# Patient Record
Sex: FEMALE | Race: BLACK OR AFRICAN AMERICAN | NOT HISPANIC OR LATINO
[De-identification: names, ages, dates, MRNs, and addresses within clinical notes are randomized per-mention and may not be internally consistent; named-entity substitution may affect disease eponyms.]

---

## 2019-05-06 ENCOUNTER — APPOINTMENT (OUTPATIENT)
Dept: GASTROENTEROLOGY | Facility: CLINIC | Age: 64
End: 2019-05-06

## 2019-06-14 ENCOUNTER — APPOINTMENT (OUTPATIENT)
Dept: GASTROENTEROLOGY | Facility: CLINIC | Age: 64
End: 2019-06-14
Payer: MEDICARE

## 2019-06-14 PROCEDURE — 45378 DIAGNOSTIC COLONOSCOPY: CPT | Mod: PT

## 2019-10-18 VITALS
TEMPERATURE: 98 F | DIASTOLIC BLOOD PRESSURE: 68 MMHG | OXYGEN SATURATION: 97 % | HEART RATE: 65 BPM | WEIGHT: 222.01 LBS | SYSTOLIC BLOOD PRESSURE: 149 MMHG | HEIGHT: 62 IN

## 2019-10-18 RX ORDER — INFLUENZA VIRUS VACCINE 15; 15; 15; 15 UG/.5ML; UG/.5ML; UG/.5ML; UG/.5ML
0.5 SUSPENSION INTRAMUSCULAR ONCE
Refills: 0 | Status: DISCONTINUED | OUTPATIENT
Start: 2019-10-21 | End: 2019-10-23

## 2019-10-21 ENCOUNTER — INPATIENT (INPATIENT)
Facility: HOSPITAL | Age: 64
LOS: 1 days | Discharge: HOME | DRG: 740 | End: 2019-10-23
Attending: OBSTETRICS & GYNECOLOGY | Admitting: OBSTETRICS & GYNECOLOGY
Payer: MEDICARE

## 2019-10-21 ENCOUNTER — RESULT REVIEW (OUTPATIENT)
Age: 64
End: 2019-10-21

## 2019-10-21 DIAGNOSIS — Z98.890 OTHER SPECIFIED POSTPROCEDURAL STATES: Chronic | ICD-10-CM

## 2019-10-21 LAB
BLD GP AB SCN SERPL QL: NEGATIVE — SIGNIFICANT CHANGE UP
GLUCOSE BLDC GLUCOMTR-MCNC: 90 MG/DL — SIGNIFICANT CHANGE UP (ref 70–99)
RH IG SCN BLD-IMP: POSITIVE — SIGNIFICANT CHANGE UP

## 2019-10-21 RX ORDER — SODIUM CHLORIDE 9 MG/ML
1000 INJECTION, SOLUTION INTRAVENOUS
Refills: 0 | Status: DISCONTINUED | OUTPATIENT
Start: 2019-10-21 | End: 2019-10-22

## 2019-10-21 RX ORDER — METOCLOPRAMIDE HCL 10 MG
10 TABLET ORAL EVERY 6 HOURS
Refills: 0 | Status: DISCONTINUED | OUTPATIENT
Start: 2019-10-21 | End: 2019-10-23

## 2019-10-21 RX ORDER — OXYCODONE HYDROCHLORIDE 5 MG/1
5 TABLET ORAL EVERY 4 HOURS
Refills: 0 | Status: DISCONTINUED | OUTPATIENT
Start: 2019-10-21 | End: 2019-10-23

## 2019-10-21 RX ORDER — HYDROMORPHONE HYDROCHLORIDE 2 MG/ML
0.5 INJECTION INTRAMUSCULAR; INTRAVENOUS; SUBCUTANEOUS
Refills: 0 | Status: DISCONTINUED | OUTPATIENT
Start: 2019-10-21 | End: 2019-10-23

## 2019-10-21 RX ORDER — BUPIVACAINE 13.3 MG/ML
20 INJECTION, SUSPENSION, LIPOSOMAL INFILTRATION ONCE
Refills: 0 | Status: DISCONTINUED | OUTPATIENT
Start: 2019-10-21 | End: 2019-10-23

## 2019-10-21 RX ORDER — ONDANSETRON 8 MG/1
4 TABLET, FILM COATED ORAL ONCE
Refills: 0 | Status: DISCONTINUED | OUTPATIENT
Start: 2019-10-21 | End: 2019-10-23

## 2019-10-21 RX ORDER — LATANOPROST 0.05 MG/ML
1 SOLUTION/ DROPS OPHTHALMIC; TOPICAL AT BEDTIME
Refills: 0 | Status: DISCONTINUED | OUTPATIENT
Start: 2019-10-21 | End: 2019-10-23

## 2019-10-21 RX ORDER — KETOROLAC TROMETHAMINE 30 MG/ML
30 SYRINGE (ML) INJECTION EVERY 6 HOURS
Refills: 0 | Status: DISCONTINUED | OUTPATIENT
Start: 2019-10-21 | End: 2019-10-23

## 2019-10-21 RX ORDER — OXYCODONE HYDROCHLORIDE 5 MG/1
10 TABLET ORAL EVERY 4 HOURS
Refills: 0 | Status: DISCONTINUED | OUTPATIENT
Start: 2019-10-21 | End: 2019-10-23

## 2019-10-21 RX ORDER — DOCUSATE SODIUM 100 MG
100 CAPSULE ORAL THREE TIMES A DAY
Refills: 0 | Status: DISCONTINUED | OUTPATIENT
Start: 2019-10-21 | End: 2019-10-23

## 2019-10-21 RX ORDER — ACETAMINOPHEN 500 MG
1000 TABLET ORAL ONCE
Refills: 0 | Status: COMPLETED | OUTPATIENT
Start: 2019-10-21 | End: 2019-10-22

## 2019-10-21 RX ADMIN — Medication 30 MILLIGRAM(S): at 23:15

## 2019-10-21 RX ADMIN — OXYCODONE HYDROCHLORIDE 5 MILLIGRAM(S): 5 TABLET ORAL at 21:27

## 2019-10-21 RX ADMIN — HYDROMORPHONE HYDROCHLORIDE 0.5 MILLIGRAM(S): 2 INJECTION INTRAMUSCULAR; INTRAVENOUS; SUBCUTANEOUS at 20:16

## 2019-10-21 RX ADMIN — HYDROMORPHONE HYDROCHLORIDE 0.5 MILLIGRAM(S): 2 INJECTION INTRAMUSCULAR; INTRAVENOUS; SUBCUTANEOUS at 19:52

## 2019-10-21 RX ADMIN — SODIUM CHLORIDE 125 MILLILITER(S): 9 INJECTION, SOLUTION INTRAVENOUS at 19:59

## 2019-10-21 RX ADMIN — OXYCODONE HYDROCHLORIDE 5 MILLIGRAM(S): 5 TABLET ORAL at 22:27

## 2019-10-21 RX ADMIN — Medication 30 MILLIGRAM(S): at 23:00

## 2019-10-21 RX ADMIN — LATANOPROST 1 DROP(S): 0.05 SOLUTION/ DROPS OPHTHALMIC; TOPICAL at 23:00

## 2019-10-21 RX ADMIN — HYDROMORPHONE HYDROCHLORIDE 0.5 MILLIGRAM(S): 2 INJECTION INTRAMUSCULAR; INTRAVENOUS; SUBCUTANEOUS at 20:11

## 2019-10-21 NOTE — PROGRESS NOTE ADULT - ASSESSMENT
A: 65yo with PMH significant for HTN and obesity s/p MERNA and BSO for endometrial hyperplasia with atypia found on workup for post-menopausal bleeding, POD0  Plan:  1. Vital signs stable, continue to monitor per protocol  2. Pain control: toradol/tylenol atc, oxy 5/10, dilaudid for breakthrough  3. DVT prophylaxis: SCDs, lovenox in am  4. CV: IVH @125cc/hr; HTN though holding home meds for now as patient was hypotensive in OR, VSS  5. Pulm: Incentive spirometer (at least 10 times per hour while awake)   6. GI: NPO except meds tonight, sips in AM; zofran/reglan for nausea prn; colace, simethicone, colace  7. : [place]   8. Follow up labs: AM CBC  9. Activity: bedrest tonight

## 2019-10-21 NOTE — H&P ADULT - NSICDXPASTMEDICALHX_GEN_ALL_CORE_FT
PAST MEDICAL HISTORY:  Endometriosis     GERD (gastroesophageal reflux disease)     HLD (hyperlipidemia)     HTN (hypertension)     Osteoarthritis

## 2019-10-21 NOTE — H&P ADULT - HISTORY OF PRESENT ILLNESS
63 yo menopausal female presenting for scheduled MERNA/BSO due to endometrial hyperplasia found on workup for postmenopausal bleeding.

## 2019-10-21 NOTE — PROGRESS NOTE ADULT - SUBJECTIVE AND OBJECTIVE BOX
GYN POC    Pt seen and examined at bedside. Abdominal pain is well controlled with pain meds and she is due for oxycodone now. Pt denies any fever, chills, chest pain, SOB, nausea or vomiting. She is using the incentive spirometer at bedside.    T(F): 98 (10-21-19 @ 21:04), Max: 98.7 (10-21-19 @ 20:28)  HR: 70 (10-21-19 @ 21:04) (62 - 73)  BP: 134/74 (10-21-19 @ 21:04) (131/63 - 151/69)  RR: 16 (10-21-19 @ 21:04) (13 - 20)  SpO2: 94% (10-21-19 @ 21:04) (93% - 96%)  Wt(kg): --    10-21 @ 07:01  -  10-21 @ 21:34  --------------------------------------------------------  IN: 250 mL / OUT: 200 mL / NET: 50 mL        acetaminophen  IVPB .. 1000 milliGRAM(s) IV Intermittent once  BUpivacaine liposome 1.3% Injectable (no eMAR) 20 milliLiter(s) Local Injection once  docusate sodium 100 milliGRAM(s) Oral three times a day PRN Stool Softening  HYDROmorphone  Injectable 0.5 milliGRAM(s) IV Push every 15 minutes PRN Severe Pain (7 - 10)  influenza   Vaccine 0.5 milliLiter(s) IntraMuscular once  ketorolac   Injectable 30 milliGRAM(s) IV Push every 6 hours  lactated ringers. 1000 milliLiter(s) IV Continuous <Continuous>  latanoprost 0.005% Ophthalmic Solution 1 Drop(s) Both EYES at bedtime  metoclopramide Injectable 10 milliGRAM(s) IV Push every 6 hours PRN Nausea and/or Vomiting  ondansetron Injectable 4 milliGRAM(s) IV Push once PRN Postoperative Nausea and/or Vomiting  oxyCODONE    IR 5 milliGRAM(s) Oral every 4 hours PRN Moderate Pain (4 - 6)  oxyCODONE    IR 10 milliGRAM(s) Oral every 4 hours PRN Severe Pain (7 - 10)      Physical exam:  Constitutional: NAD  Pulmonary: regular respiratory effort  Abdomen: pressure dressing in place, obese abdomen soft and non-tender, non-distended  Extremities: no lower extremity edema, or calve tenderness. SCDs in place

## 2019-10-21 NOTE — H&P ADULT - ASSESSMENT
63 yo female to be admitted to regional floor for routine postoperative care following scheduled surgery

## 2019-10-21 NOTE — BRIEF OPERATIVE NOTE - PRIMARY SURGEON
Per Phoebe Funez, DPFIONA Ania does not need to be pre medicated with an antibiotic.    was notified of this via voicemail message.    Dr Leatha Castano

## 2019-10-21 NOTE — BRIEF OPERATIVE NOTE - OPERATION/FINDINGS
8cm mobile uterus on bimanual exam.  No evidence of intraabdominal pathology.  Uterus with small anterior fibroid , minimal adnexal adhesions.  Ovaries and fallopian tubes grossly normal.  Small calcified nodule noted in right posterior cul de sac.

## 2019-10-21 NOTE — H&P ADULT - NSICDXPASTSURGICALHX_GEN_ALL_CORE_FT
PAST SURGICAL HISTORY:  History of breast biopsy left    History of colonoscopy     History of hand surgery Right hand    History of shoulder surgery Right Rotator cuff repair

## 2019-10-22 LAB
ANION GAP SERPL CALC-SCNC: 11 MMOL/L — SIGNIFICANT CHANGE UP (ref 5–17)
BASOPHILS # BLD AUTO: 0.01 K/UL — SIGNIFICANT CHANGE UP (ref 0–0.2)
BASOPHILS NFR BLD AUTO: 0.1 % — SIGNIFICANT CHANGE UP (ref 0–2)
BUN SERPL-MCNC: 17 MG/DL — SIGNIFICANT CHANGE UP (ref 7–23)
CALCIUM SERPL-MCNC: 8.8 MG/DL — SIGNIFICANT CHANGE UP (ref 8.4–10.5)
CHLORIDE SERPL-SCNC: 103 MMOL/L — SIGNIFICANT CHANGE UP (ref 96–108)
CO2 SERPL-SCNC: 26 MMOL/L — SIGNIFICANT CHANGE UP (ref 22–31)
CREAT SERPL-MCNC: 1.04 MG/DL — SIGNIFICANT CHANGE UP (ref 0.5–1.3)
EOSINOPHIL # BLD AUTO: 0 K/UL — SIGNIFICANT CHANGE UP (ref 0–0.5)
EOSINOPHIL NFR BLD AUTO: 0 % — SIGNIFICANT CHANGE UP (ref 0–6)
GLUCOSE SERPL-MCNC: 137 MG/DL — HIGH (ref 70–99)
HCT VFR BLD CALC: 37.7 % — SIGNIFICANT CHANGE UP (ref 34.5–45)
HCV AB S/CO SERPL IA: 0.09 S/CO — SIGNIFICANT CHANGE UP
HCV AB SERPL-IMP: SIGNIFICANT CHANGE UP
HGB BLD-MCNC: 12.2 G/DL — SIGNIFICANT CHANGE UP (ref 11.5–15.5)
IMM GRANULOCYTES NFR BLD AUTO: 0.4 % — SIGNIFICANT CHANGE UP (ref 0–1.5)
LYMPHOCYTES # BLD AUTO: 1.16 K/UL — SIGNIFICANT CHANGE UP (ref 1–3.3)
LYMPHOCYTES # BLD AUTO: 10.3 % — LOW (ref 13–44)
MCHC RBC-ENTMCNC: 26.9 PG — LOW (ref 27–34)
MCHC RBC-ENTMCNC: 32.4 GM/DL — SIGNIFICANT CHANGE UP (ref 32–36)
MCV RBC AUTO: 83 FL — SIGNIFICANT CHANGE UP (ref 80–100)
MONOCYTES # BLD AUTO: 0.37 K/UL — SIGNIFICANT CHANGE UP (ref 0–0.9)
MONOCYTES NFR BLD AUTO: 3.3 % — SIGNIFICANT CHANGE UP (ref 2–14)
NEUTROPHILS # BLD AUTO: 9.71 K/UL — HIGH (ref 1.8–7.4)
NEUTROPHILS NFR BLD AUTO: 85.9 % — HIGH (ref 43–77)
NRBC # BLD: 0 /100 WBCS — SIGNIFICANT CHANGE UP (ref 0–0)
PLATELET # BLD AUTO: 224 K/UL — SIGNIFICANT CHANGE UP (ref 150–400)
POTASSIUM SERPL-MCNC: 3.6 MMOL/L — SIGNIFICANT CHANGE UP (ref 3.5–5.3)
POTASSIUM SERPL-SCNC: 3.6 MMOL/L — SIGNIFICANT CHANGE UP (ref 3.5–5.3)
RBC # BLD: 4.54 M/UL — SIGNIFICANT CHANGE UP (ref 3.8–5.2)
RBC # FLD: 17 % — HIGH (ref 10.3–14.5)
SODIUM SERPL-SCNC: 140 MMOL/L — SIGNIFICANT CHANGE UP (ref 135–145)
WBC # BLD: 11.29 K/UL — HIGH (ref 3.8–10.5)
WBC # FLD AUTO: 11.29 K/UL — HIGH (ref 3.8–10.5)

## 2019-10-22 RX ORDER — POTASSIUM CHLORIDE 20 MEQ
20 PACKET (EA) ORAL
Refills: 0 | Status: COMPLETED | OUTPATIENT
Start: 2019-10-22 | End: 2019-10-23

## 2019-10-22 RX ORDER — ENOXAPARIN SODIUM 100 MG/ML
40 INJECTION SUBCUTANEOUS DAILY
Refills: 0 | Status: DISCONTINUED | OUTPATIENT
Start: 2019-10-22 | End: 2019-10-23

## 2019-10-22 RX ADMIN — Medication 30 MILLIGRAM(S): at 11:08

## 2019-10-22 RX ADMIN — Medication 30 MILLIGRAM(S): at 06:01

## 2019-10-22 RX ADMIN — OXYCODONE HYDROCHLORIDE 5 MILLIGRAM(S): 5 TABLET ORAL at 02:33

## 2019-10-22 RX ADMIN — OXYCODONE HYDROCHLORIDE 5 MILLIGRAM(S): 5 TABLET ORAL at 17:15

## 2019-10-22 RX ADMIN — Medication 30 MILLIGRAM(S): at 17:29

## 2019-10-22 RX ADMIN — OXYCODONE HYDROCHLORIDE 5 MILLIGRAM(S): 5 TABLET ORAL at 12:07

## 2019-10-22 RX ADMIN — Medication 20 MILLIEQUIVALENT(S): at 17:10

## 2019-10-22 RX ADMIN — OXYCODONE HYDROCHLORIDE 5 MILLIGRAM(S): 5 TABLET ORAL at 11:07

## 2019-10-22 RX ADMIN — Medication 30 MILLIGRAM(S): at 11:23

## 2019-10-22 RX ADMIN — OXYCODONE HYDROCHLORIDE 5 MILLIGRAM(S): 5 TABLET ORAL at 01:33

## 2019-10-22 RX ADMIN — OXYCODONE HYDROCHLORIDE 5 MILLIGRAM(S): 5 TABLET ORAL at 06:44

## 2019-10-22 RX ADMIN — Medication 400 MILLIGRAM(S): at 00:46

## 2019-10-22 RX ADMIN — Medication 30 MILLIGRAM(S): at 17:10

## 2019-10-22 RX ADMIN — LATANOPROST 1 DROP(S): 0.05 SOLUTION/ DROPS OPHTHALMIC; TOPICAL at 22:25

## 2019-10-22 RX ADMIN — ENOXAPARIN SODIUM 40 MILLIGRAM(S): 100 INJECTION SUBCUTANEOUS at 11:07

## 2019-10-22 RX ADMIN — Medication 1000 MILLIGRAM(S): at 00:01

## 2019-10-22 RX ADMIN — OXYCODONE HYDROCHLORIDE 5 MILLIGRAM(S): 5 TABLET ORAL at 18:15

## 2019-10-22 RX ADMIN — Medication 30 MILLIGRAM(S): at 05:36

## 2019-10-22 RX ADMIN — OXYCODONE HYDROCHLORIDE 5 MILLIGRAM(S): 5 TABLET ORAL at 07:44

## 2019-10-22 NOTE — PROGRESS NOTE ADULT - ASSESSMENT
A: 63yo with PMH significant for HTN and obesity s/p MERNA and BSO for endometrial hyperplasia with atypia found on workup for post-menopausal bleeding, POD1  Plan:  1. Vital signs stable, continue to monitor per protocol  2. Pain control: toradol/tylenol atc, oxy 5/10, dilaudid for breakthrough  3. DVT prophylaxis: SCDs, lovenox  4. CV: IVH @125cc/hr; HTN though holding home meds for now as patient was hypotensive in OR, VSS  5. Pulm: Incentive spirometer (at least 10 times per hour while awake)   6. GI: sips this AM,  zofran/reglan for nausea prn; colace, simethicone, colace  7. : [place], f/u TOV  8. Follow up labs: AM CBC  9. Activity: ambulate as tolerated

## 2019-10-22 NOTE — PROGRESS NOTE ADULT - ASSESSMENT
64yo F POD1 s/p MERNA, BSO. Pt is hemodynamically stable.     1. Neuro/Pain: Toradol/Tylenol ATC, Oxycodone PRN  2  CV: VS per routine  3. Pulm: Encourage ISS at least 10 times per hour while awake   4. GI: NPO+sips, Clears for dinner  Simethicone/zofran/reglan/colace  5. : Voiding   6. Heme: stable  7. DVT ppx: SCDs  8. Activity: ambulate as tolerated

## 2019-10-22 NOTE — PROGRESS NOTE ADULT - SUBJECTIVE AND OBJECTIVE BOX
GYN Progress Note    Patient seen at bedside.  YVON overnight.  Reports feeling well this AM.  Pain controlled on Toradol/tylenol with Oxycodone PRN.    She is NPO - no nausea or vomiting.  Holder is in situ and she is not yet voiding or ambulating.      Denies CP, palpitations, SOB, fever, chills.    Vital Signs Last 24 Hrs  T(C): 36.5 (22 Oct 2019 05:43), Max: 37.1 (21 Oct 2019 20:28)  T(F): 97.7 (22 Oct 2019 05:43), Max: 98.7 (21 Oct 2019 20:28)  HR: 66 (22 Oct 2019 05:43) (62 - 73)  BP: 128/71 (22 Oct 2019 05:43) (122/68 - 151/69)  BP(mean): 89 (21 Oct 2019 20:20) (87 - 99)  RR: 16 (22 Oct 2019 05:43) (13 - 20)  SpO2: 97% (22 Oct 2019 05:43) (93% - 97%)    Physical Exam:  Gen: No Acute Distress  Pulm: Clear to auscultation bilaterally  GI: soft, nontender, mildly distended, hypoactive BS, no rebound, no guarding.  Pressure dressing c/d/i over [place]ste[place]  Ext: SCDs in place, [facility]    I&O's Summary    21 Oct 2019 07:01  -  22 Oct 2019 06:40  --------------------------------------------------------  IN: 250 mL / OUT: 700 mL / NET: -450 mL      MEDICATIONS  (STANDING):  BUpivacaine liposome 1.3% Injectable (no eMAR) 20 milliLiter(s) Local Injection once  enoxaparin Injectable 40 milliGRAM(s) SubCutaneous daily  influenza   Vaccine 0.5 milliLiter(s) IntraMuscular once  ketorolac   Injectable 30 milliGRAM(s) IV Push every 6 hours  lactated ringers. 1000 milliLiter(s) (125 mL/Hr) IV Continuous <Continuous>  latanoprost 0.005% Ophthalmic Solution 1 Drop(s) Both EYES at bedtime    MEDICATIONS  (PRN):  docusate sodium 100 milliGRAM(s) Oral three times a day PRN Stool Softening  HYDROmorphone  Injectable 0.5 milliGRAM(s) IV Push every 15 minutes PRN Severe Pain (7 - 10)  metoclopramide Injectable 10 milliGRAM(s) IV Push every 6 hours PRN Nausea and/or Vomiting  ondansetron Injectable 4 milliGRAM(s) IV Push once PRN Postoperative Nausea and/or Vomiting  oxyCODONE    IR 5 milliGRAM(s) Oral every 4 hours PRN Moderate Pain (4 - 6)  oxyCODONE    IR 10 milliGRAM(s) Oral every 4 hours PRN Severe Pain (7 - 10)      LABS:

## 2019-10-22 NOTE — PROGRESS NOTE ADULT - SUBJECTIVE AND OBJECTIVE BOX
Pt seen and examined at bedside. Pt states mild abdominal pain controlled with pain medication. Pt is ambulating, tolerating sips, and voiding. She has not passed flatus yet.    Pt denies fever, chills, chest pain, SOB, nausea, vomiting, lightheadedness, or dizziness.      T(F): 97.2 (10-22-19 @ 08:25), Max: 98.7 (10-21-19 @ 20:28)  HR: 69 (10-22-19 @ 08:25) (62 - 73)  BP: 118/72 (10-22-19 @ 08:25) (118/72 - 151/69)  RR: 17 (10-22-19 @ 08:25) (13 - 20)  SpO2: 95% (10-22-19 @ 08:25) (93% - 97%)  Wt(kg): --  I&O's Summary    21 Oct 2019 07:01  -  22 Oct 2019 07:00  --------------------------------------------------------  IN: 250 mL / OUT: 700 mL / NET: -450 mL    MEDICATIONS  (STANDING):  BUpivacaine liposome 1.3% Injectable (no eMAR) 20 milliLiter(s) Local Injection once  enoxaparin Injectable 40 milliGRAM(s) SubCutaneous daily  influenza   Vaccine 0.5 milliLiter(s) IntraMuscular once  ketorolac   Injectable 30 milliGRAM(s) IV Push every 6 hours  lactated ringers. 1000 milliLiter(s) (125 mL/Hr) IV Continuous <Continuous>  latanoprost 0.005% Ophthalmic Solution 1 Drop(s) Both EYES at bedtime  potassium chloride    Tablet ER 20 milliEquivalent(s) Oral two times a day    MEDICATIONS  (PRN):  docusate sodium 100 milliGRAM(s) Oral three times a day PRN Stool Softening  HYDROmorphone  Injectable 0.5 milliGRAM(s) IV Push every 15 minutes PRN Severe Pain (7 - 10)  metoclopramide Injectable 10 milliGRAM(s) IV Push every 6 hours PRN Nausea and/or Vomiting  ondansetron Injectable 4 milliGRAM(s) IV Push once PRN Postoperative Nausea and/or Vomiting  oxyCODONE    IR 5 milliGRAM(s) Oral every 4 hours PRN Moderate Pain (4 - 6)  oxyCODONE    IR 10 milliGRAM(s) Oral every 4 hours PRN Severe Pain (7 - 10)    Physical Exam:  Constitutional: NAD  Pulmonary: clear to auscultation bilaterally   Cardiovascular: Regular rate and rhythm   Abdomen: incision site clean, dry, intact. Soft, mildly tender, nondistended, no guarding, no rebound, +bowel sounds  Extremities: no lower extremity edema or calve tenderness. SCDs in place     LABS:                        12.2   11.29 )-----------( 224      ( 22 Oct 2019 06:20 )             37.7     10-22    140  |  103  |  17  ----------------------------<  137<H>  3.6   |  26  |  1.04    Ca    8.8      22 Oct 2019 06:20            RADIOLOGY & ADDITIONAL TESTS:

## 2019-10-23 ENCOUNTER — TRANSCRIPTION ENCOUNTER (OUTPATIENT)
Age: 64
End: 2019-10-23

## 2019-10-23 VITALS
TEMPERATURE: 98 F | RESPIRATION RATE: 16 BRPM | SYSTOLIC BLOOD PRESSURE: 116 MMHG | HEART RATE: 63 BPM | DIASTOLIC BLOOD PRESSURE: 65 MMHG | OXYGEN SATURATION: 96 %

## 2019-10-23 LAB
ANION GAP SERPL CALC-SCNC: 10 MMOL/L — SIGNIFICANT CHANGE UP (ref 5–17)
ANION GAP SERPL CALC-SCNC: 9 MMOL/L — SIGNIFICANT CHANGE UP (ref 5–17)
BUN SERPL-MCNC: 18 MG/DL — SIGNIFICANT CHANGE UP (ref 7–23)
BUN SERPL-MCNC: 19 MG/DL — SIGNIFICANT CHANGE UP (ref 7–23)
CALCIUM SERPL-MCNC: 8.5 MG/DL — SIGNIFICANT CHANGE UP (ref 8.4–10.5)
CALCIUM SERPL-MCNC: 8.8 MG/DL — SIGNIFICANT CHANGE UP (ref 8.4–10.5)
CHLORIDE SERPL-SCNC: 104 MMOL/L — SIGNIFICANT CHANGE UP (ref 96–108)
CHLORIDE SERPL-SCNC: 105 MMOL/L — SIGNIFICANT CHANGE UP (ref 96–108)
CO2 SERPL-SCNC: 27 MMOL/L — SIGNIFICANT CHANGE UP (ref 22–31)
CO2 SERPL-SCNC: 28 MMOL/L — SIGNIFICANT CHANGE UP (ref 22–31)
CREAT ?TM UR-MCNC: 185 MG/DL — SIGNIFICANT CHANGE UP
CREAT SERPL-MCNC: 1.09 MG/DL — SIGNIFICANT CHANGE UP (ref 0.5–1.3)
CREAT SERPL-MCNC: 1.13 MG/DL — SIGNIFICANT CHANGE UP (ref 0.5–1.3)
GLUCOSE SERPL-MCNC: 117 MG/DL — HIGH (ref 70–99)
GLUCOSE SERPL-MCNC: 136 MG/DL — HIGH (ref 70–99)
HCT VFR BLD CALC: 33.4 % — LOW (ref 34.5–45)
HGB BLD-MCNC: 11 G/DL — LOW (ref 11.5–15.5)
MAGNESIUM SERPL-MCNC: 2.2 MG/DL — SIGNIFICANT CHANGE UP (ref 1.6–2.6)
MCHC RBC-ENTMCNC: 27.6 PG — SIGNIFICANT CHANGE UP (ref 27–34)
MCHC RBC-ENTMCNC: 32.9 GM/DL — SIGNIFICANT CHANGE UP (ref 32–36)
MCV RBC AUTO: 83.7 FL — SIGNIFICANT CHANGE UP (ref 80–100)
NON-GYNECOLOGICAL CYTOLOGY STUDY: SIGNIFICANT CHANGE UP
NRBC # BLD: 0 /100 WBCS — SIGNIFICANT CHANGE UP (ref 0–0)
PHOSPHATE SERPL-MCNC: 3.3 MG/DL — SIGNIFICANT CHANGE UP (ref 2.5–4.5)
PLATELET # BLD AUTO: 217 K/UL — SIGNIFICANT CHANGE UP (ref 150–400)
POTASSIUM SERPL-MCNC: 3.6 MMOL/L — SIGNIFICANT CHANGE UP (ref 3.5–5.3)
POTASSIUM SERPL-MCNC: 4.3 MMOL/L — SIGNIFICANT CHANGE UP (ref 3.5–5.3)
POTASSIUM SERPL-SCNC: 3.6 MMOL/L — SIGNIFICANT CHANGE UP (ref 3.5–5.3)
POTASSIUM SERPL-SCNC: 4.3 MMOL/L — SIGNIFICANT CHANGE UP (ref 3.5–5.3)
RBC # BLD: 3.99 M/UL — SIGNIFICANT CHANGE UP (ref 3.8–5.2)
RBC # FLD: 17.1 % — HIGH (ref 10.3–14.5)
SODIUM SERPL-SCNC: 140 MMOL/L — SIGNIFICANT CHANGE UP (ref 135–145)
SODIUM SERPL-SCNC: 143 MMOL/L — SIGNIFICANT CHANGE UP (ref 135–145)
SODIUM UR-SCNC: 20 MMOL/L — SIGNIFICANT CHANGE UP
SURGICAL PATHOLOGY STUDY: SIGNIFICANT CHANGE UP
WBC # BLD: 10.54 K/UL — HIGH (ref 3.8–10.5)
WBC # FLD AUTO: 10.54 K/UL — HIGH (ref 3.8–10.5)

## 2019-10-23 RX ORDER — MELOXICAM 15 MG/1
1 TABLET ORAL
Qty: 0 | Refills: 0 | DISCHARGE

## 2019-10-23 RX ORDER — IBUPROFEN 200 MG
600 TABLET ORAL EVERY 6 HOURS
Refills: 0 | Status: DISCONTINUED | OUTPATIENT
Start: 2019-10-23 | End: 2019-10-23

## 2019-10-23 RX ORDER — TRIAMTERENE/HYDROCHLOROTHIAZID 75 MG-50MG
1 TABLET ORAL
Qty: 0 | Refills: 0 | DISCHARGE

## 2019-10-23 RX ORDER — CARVEDILOL PHOSPHATE 80 MG/1
1 CAPSULE, EXTENDED RELEASE ORAL
Qty: 0 | Refills: 0 | DISCHARGE

## 2019-10-23 RX ORDER — OXYCODONE HYDROCHLORIDE 5 MG/1
1 TABLET ORAL
Qty: 0 | Refills: 0 | DISCHARGE
Start: 2019-10-23

## 2019-10-23 RX ORDER — MONTELUKAST 4 MG/1
1 TABLET, CHEWABLE ORAL
Qty: 0 | Refills: 0 | DISCHARGE

## 2019-10-23 RX ORDER — AMLODIPINE BESYLATE 2.5 MG/1
1 TABLET ORAL
Qty: 0 | Refills: 0 | DISCHARGE

## 2019-10-23 RX ORDER — MISOPROSTOL 200 UG/1
2 TABLET ORAL
Qty: 0 | Refills: 0 | DISCHARGE

## 2019-10-23 RX ORDER — ASPIRIN/CALCIUM CARB/MAGNESIUM 324 MG
1 TABLET ORAL
Qty: 0 | Refills: 0 | DISCHARGE

## 2019-10-23 RX ORDER — IBUPROFEN 200 MG
1 TABLET ORAL
Qty: 0 | Refills: 0 | DISCHARGE
Start: 2019-10-23

## 2019-10-23 RX ADMIN — Medication 30 MILLIGRAM(S): at 06:17

## 2019-10-23 RX ADMIN — OXYCODONE HYDROCHLORIDE 5 MILLIGRAM(S): 5 TABLET ORAL at 11:34

## 2019-10-23 RX ADMIN — ENOXAPARIN SODIUM 40 MILLIGRAM(S): 100 INJECTION SUBCUTANEOUS at 12:03

## 2019-10-23 RX ADMIN — Medication 30 MILLIGRAM(S): at 12:03

## 2019-10-23 RX ADMIN — Medication 30 MILLIGRAM(S): at 00:14

## 2019-10-23 RX ADMIN — Medication 20 MILLIEQUIVALENT(S): at 06:17

## 2019-10-23 RX ADMIN — Medication 30 MILLIGRAM(S): at 12:18

## 2019-10-23 RX ADMIN — OXYCODONE HYDROCHLORIDE 10 MILLIGRAM(S): 5 TABLET ORAL at 21:52

## 2019-10-23 RX ADMIN — OXYCODONE HYDROCHLORIDE 5 MILLIGRAM(S): 5 TABLET ORAL at 17:03

## 2019-10-23 RX ADMIN — Medication 600 MILLIGRAM(S): at 18:11

## 2019-10-23 RX ADMIN — OXYCODONE HYDROCHLORIDE 5 MILLIGRAM(S): 5 TABLET ORAL at 04:40

## 2019-10-23 RX ADMIN — OXYCODONE HYDROCHLORIDE 5 MILLIGRAM(S): 5 TABLET ORAL at 10:34

## 2019-10-23 RX ADMIN — Medication 600 MILLIGRAM(S): at 19:11

## 2019-10-23 RX ADMIN — Medication 30 MILLIGRAM(S): at 03:04

## 2019-10-23 RX ADMIN — OXYCODONE HYDROCHLORIDE 5 MILLIGRAM(S): 5 TABLET ORAL at 03:04

## 2019-10-23 NOTE — PROGRESS NOTE ADULT - ASSESSMENT
66yo F POD2 s/p MERNA, BSO for CAH.     1. Neuro/Pain: Toradol/Tylenol ATC, Oxycodone PRN, s/p exparel in OR  2  CV: VS per routine  3. Pulm: Encourage ISS at least 10 times per hour while awake   4. GI:  Tolerating clears, regular with flatus. Simethicone/zofran/reglan/colace  5. : Voiding   6. Heme: stable  7. DVT ppx: SCDs, lovenox  8. Activity: ambulate as tolerated

## 2019-10-23 NOTE — DIETITIAN INITIAL EVALUATION ADULT. - ENERGY NEEDS
Ht (10/21): 157.5cm, Wt (10/21): 100.7kg, IBW:  50kg+/-10%, %IBW: 201% BMI: 40.6   Upper end of IBW (55kg) used to calculate energy needs due to pt's current body weight exceeding 120% of IBW. Needs adjusted for age, post-op, overweight status.

## 2019-10-23 NOTE — PROGRESS NOTE ADULT - SUBJECTIVE AND OBJECTIVE BOX
GYN Progress Note    Patient seen at bedside for AM rounds.  YVON overnight.  Tolerated vegetable broth and chicken broth for dinner, no nausea or vomiting.  Feels mild pain but overall controlled with OPM.  She is ambulating to restroom and voiding spontaneously.  No flatus yet.      Vital Signs Last 24 Hrs  T(C): 36.6 (23 Oct 2019 06:23), Max: 36.7 (22 Oct 2019 15:46)  T(F): 97.9 (23 Oct 2019 06:23), Max: 98.1 (22 Oct 2019 22:30)  HR: 55 (23 Oct 2019 06:23) (55 - 69)  BP: 138/81 (23 Oct 2019 06:23) (110/70 - 138/81)  BP(mean): --  RR: 17 (23 Oct 2019 06:23) (16 - 17)  SpO2: 94% (23 Oct 2019 06:23) (94% - 96%)    Physical Exam:  Gen: No Acute Distress  Pulm: Clear to auscultation bilaterally  GI: soft, nontender, nondistended, +BS, no rebound, no guarding.  Incision C/D/I with staples in place  Ext: SCDs in place, wnl    I&O's Summary    22 Oct 2019 07:01  -  23 Oct 2019 07:00  --------------------------------------------------------  IN: 300 mL / OUT: 401 mL / NET: -101 mL      MEDICATIONS  (STANDING):  BUpivacaine liposome 1.3% Injectable (no eMAR) 20 milliLiter(s) Local Injection once  enoxaparin Injectable 40 milliGRAM(s) SubCutaneous daily  influenza   Vaccine 0.5 milliLiter(s) IntraMuscular once  ketorolac   Injectable 30 milliGRAM(s) IV Push every 6 hours  latanoprost 0.005% Ophthalmic Solution 1 Drop(s) Both EYES at bedtime    MEDICATIONS  (PRN):  docusate sodium 100 milliGRAM(s) Oral three times a day PRN Stool Softening  HYDROmorphone  Injectable 0.5 milliGRAM(s) IV Push every 15 minutes PRN Severe Pain (7 - 10)  metoclopramide Injectable 10 milliGRAM(s) IV Push every 6 hours PRN Nausea and/or Vomiting  ondansetron Injectable 4 milliGRAM(s) IV Push once PRN Postoperative Nausea and/or Vomiting  oxyCODONE    IR 5 milliGRAM(s) Oral every 4 hours PRN Moderate Pain (4 - 6)  oxyCODONE    IR 10 milliGRAM(s) Oral every 4 hours PRN Severe Pain (7 - 10)      LABS:                        11.0   10.54 )-----------( 217      ( 23 Oct 2019 07:25 )             33.4     10-22    140  |  103  |  17  ----------------------------<  137<H>  3.6   |  26  |  1.04    Ca    8.8      22 Oct 2019 06:20

## 2019-10-23 NOTE — PROGRESS NOTE ADULT - ASSESSMENT
64yo F POD2 s/p MERNA, BSO for CAH.     1. Neuro/Pain: Toradol/Tylenol ATC, Oxycodone PRN, s/p exparel in OR  2  CV: VS per routine  3. Pulm: Encourage ISS at least 10 times per hour while awake   4. GI:  passing flatus, advance to regular.   Simethicone/zofran/reglan/colace  5. : Voiding   6. Heme: stable  7. DVT ppx: SCDs, lovenox  8. Activity: ambulate as tolerated

## 2019-10-23 NOTE — CHART NOTE - NSCHARTNOTEFT_GEN_A_CORE
Upon Nutritional Assessment by the Registered Dietitian your patient was determined to meet criteria / has evidence of the following diagnosis/diagnoses:          [ ]  Mild Protein Calorie Malnutrition        [ ]  Moderate Protein Calorie Malnutrition        [ ] Severe Protein Calorie Malnutrition        [ ] Unspecified Protein Calorie Malnutrition        [ ] Underweight / BMI <19        [X ] Morbid Obesity / BMI > 40    BMI=40.6    Findings as based on:  •  Comprehensive nutrition assessment and consultation    PROVIDER Section:     By signing this assessment you are acknowledging and agree with the diagnosis/diagnoses assigned by the Registered Dietitian    Comments:

## 2019-10-23 NOTE — DISCHARGE NOTE PROVIDER - HOSPITAL COURSE
63yo P3 with PMH significant for hypertension, asthma, and obesity who presented for scheduled total abdominal hysterectomy and bilateral salpingo-oophorectomy for complex endometrial hyperplasia with atypia, uncomplicated. She is meeting all post-op milestones on POD2 and is clinically and hemodynamically stable for discharge home.

## 2019-10-23 NOTE — DISCHARGE NOTE PROVIDER - CARE PROVIDER_API CALL
Leatha Castano)  Obstetrics and Gynecology  [address] [id number]  Phone: [phone]  Fax: [phone]  Follow Up Time:

## 2019-10-23 NOTE — DIETITIAN INITIAL EVALUATION ADULT. - OTHER INFO
64yo F hx endometriosis, GERD, HTN/HLD, osteoarthritis, now s/p MERNA, BSO for CAH on 10/21. Pt resting in bed, pain controlled with medications, pt denies N/V, last BM 10/20 (pt reports regular BM PTA), +flatus, Jose Manuel score 20. Pt with good appetite now and PTA, consuming >75% of meals at this time. Pt follows [employer] at home, has successfully lost 45lb in the past 5 months 2/2 diet/lifestyle changes. Pt denies food allergies. Encouraged continued efforts for gradual weight loss. Pt reports hx pre-DM, discussed CSTCHO diet with pt. Also reviewed following low sodium diet which pt tried to do PTA. Encouraged adequate protein intake post-op. Pt appeared receptive to education/recommendations. RD to monitor and f/u per protocol.

## 2019-10-23 NOTE — DISCHARGE NOTE PROVIDER - NSDCCPCAREPLAN_GEN_ALL_CORE_FT
PRINCIPAL DISCHARGE DIAGNOSIS  Diagnosis: Endometrial hyperplasia  Assessment and Plan of Treatment:

## 2019-10-23 NOTE — DISCHARGE NOTE NURSING/CASE MANAGEMENT/SOCIAL WORK - PATIENT PORTAL LINK FT
You can access the FollowMyHealth Patient Portal offered by [facility] by registering at the following website: [email or web address]. By joining [employer]’s FollowMyHealth portal, you will also be able to view your health information using other applications (apps) compatible with our system.

## 2019-10-23 NOTE — DISCHARGE NOTE PROVIDER - NSDCFUADDINST_GEN_ALL_CORE_FT
- Nothing in vagina - no intercourse, tampons, or douching until cleared by your doctor.   - Avoid swimming, tub baths, and heavy lifting until cleared by your doctor.   - Showering is ok.   - Continue oral pain medications as needed for pain. Take percocet as needed for severe pain.  - Follow up in office in 1 week for your postoperative visit on 10/30 in the morning.  - Call the office sooner if you develop any fever, heavy bleeding, or severe pain.  Go to the closest emergency room for any of these symptoms if you are not able to contact your doctor. - Nothing in vagina - no intercourse, tampons, or douching until cleared by your doctor.   - Avoid swimming, tub baths, and heavy lifting until cleared by your doctor.   - Showering is ok.   - Continue oral pain medications Motrin and Tylenol over the counter as needed for mild/moderate pain. Percocet as needed for severe pain (sent to your pharmacy).  - Follow up in office in 1 week for your postoperative visit on 10/30 in the morning.  - Call the office sooner if you develop any fever, heavy bleeding, or severe pain.  Go to the closest emergency room for any of these symptoms if you are not able to contact your doctor.

## 2019-11-06 DIAGNOSIS — J45.909 UNSPECIFIED ASTHMA, UNCOMPLICATED: ICD-10-CM

## 2019-11-06 DIAGNOSIS — I10 ESSENTIAL (PRIMARY) HYPERTENSION: ICD-10-CM

## 2019-11-06 DIAGNOSIS — E78.5 HYPERLIPIDEMIA, UNSPECIFIED: ICD-10-CM

## 2019-11-06 DIAGNOSIS — E66.9 OBESITY, UNSPECIFIED: ICD-10-CM

## 2019-11-06 DIAGNOSIS — N92.4 EXCESSIVE BLEEDING IN THE PREMENOPAUSAL PERIOD: ICD-10-CM

## 2019-11-06 DIAGNOSIS — C54.1 MALIGNANT NEOPLASM OF ENDOMETRIUM: ICD-10-CM

## 2019-11-06 DIAGNOSIS — H40.9 UNSPECIFIED GLAUCOMA: ICD-10-CM

## 2019-11-06 DIAGNOSIS — D25.9 LEIOMYOMA OF UTERUS, UNSPECIFIED: ICD-10-CM

## 2019-11-06 DIAGNOSIS — R10.2 PELVIC AND PERINEAL PAIN: ICD-10-CM

## 2019-11-06 DIAGNOSIS — I95.9 HYPOTENSION, UNSPECIFIED: ICD-10-CM

## 2019-11-12 PROCEDURE — 88112 CYTOPATH CELL ENHANCE TECH: CPT

## 2019-11-12 PROCEDURE — C1889: CPT

## 2019-11-12 PROCEDURE — 88305 TISSUE EXAM BY PATHOLOGIST: CPT

## 2019-11-12 PROCEDURE — 82570 ASSAY OF URINE CREATININE: CPT

## 2019-11-12 PROCEDURE — 83735 ASSAY OF MAGNESIUM: CPT

## 2019-11-12 PROCEDURE — 86850 RBC ANTIBODY SCREEN: CPT

## 2019-11-12 PROCEDURE — 36415 COLL VENOUS BLD VENIPUNCTURE: CPT

## 2019-11-12 PROCEDURE — 86923 COMPATIBILITY TEST ELECTRIC: CPT

## 2019-11-12 PROCEDURE — 88311 DECALCIFY TISSUE: CPT

## 2019-11-12 PROCEDURE — 82962 GLUCOSE BLOOD TEST: CPT

## 2019-11-12 PROCEDURE — 85025 COMPLETE CBC W/AUTO DIFF WBC: CPT

## 2019-11-12 PROCEDURE — 84100 ASSAY OF PHOSPHORUS: CPT

## 2019-11-12 PROCEDURE — 85027 COMPLETE CBC AUTOMATED: CPT

## 2019-11-12 PROCEDURE — 88304 TISSUE EXAM BY PATHOLOGIST: CPT

## 2019-11-12 PROCEDURE — 88307 TISSUE EXAM BY PATHOLOGIST: CPT

## 2019-11-12 PROCEDURE — 86803 HEPATITIS C AB TEST: CPT

## 2019-11-12 PROCEDURE — 84300 ASSAY OF URINE SODIUM: CPT

## 2019-11-12 PROCEDURE — 86900 BLOOD TYPING SEROLOGIC ABO: CPT

## 2019-11-12 PROCEDURE — 80048 BASIC METABOLIC PNL TOTAL CA: CPT

## 2019-11-12 PROCEDURE — 86901 BLOOD TYPING SEROLOGIC RH(D): CPT

## 2021-01-08 PROBLEM — K21.9 GASTRO-ESOPHAGEAL REFLUX DISEASE WITHOUT ESOPHAGITIS: Chronic | Status: ACTIVE | Noted: 2019-10-18

## 2021-01-08 PROBLEM — N80.9 ENDOMETRIOSIS, UNSPECIFIED: Chronic | Status: ACTIVE | Noted: 2019-10-18

## 2021-01-08 PROBLEM — E78.5 HYPERLIPIDEMIA, UNSPECIFIED: Chronic | Status: ACTIVE | Noted: 2019-10-18

## 2021-01-08 PROBLEM — M19.90 UNSPECIFIED OSTEOARTHRITIS, UNSPECIFIED SITE: Chronic | Status: ACTIVE | Noted: 2019-10-18

## 2021-01-08 PROBLEM — I10 ESSENTIAL (PRIMARY) HYPERTENSION: Chronic | Status: ACTIVE | Noted: 2019-10-18

## 2021-01-21 ENCOUNTER — NON-APPOINTMENT (OUTPATIENT)
Age: 66
End: 2021-01-21

## 2021-01-29 ENCOUNTER — APPOINTMENT (OUTPATIENT)
Dept: OTOLARYNGOLOGY | Facility: CLINIC | Age: 66
End: 2021-01-29
Payer: MEDICARE

## 2021-01-29 VITALS
SYSTOLIC BLOOD PRESSURE: 141 MMHG | DIASTOLIC BLOOD PRESSURE: 83 MMHG | HEIGHT: 62 IN | HEART RATE: 80 BPM | BODY MASS INDEX: 47.84 KG/M2 | OXYGEN SATURATION: 98 % | TEMPERATURE: 97.6 F | WEIGHT: 260 LBS

## 2021-01-29 DIAGNOSIS — I10 ESSENTIAL (PRIMARY) HYPERTENSION: ICD-10-CM

## 2021-01-29 PROCEDURE — 99204 OFFICE O/P NEW MOD 45 MIN: CPT | Mod: 25

## 2021-01-29 PROCEDURE — 92550 TYMPANOMETRY & REFLEX THRESH: CPT

## 2021-01-29 PROCEDURE — 69801 INCISE INNER EAR: CPT | Mod: RT

## 2021-01-29 PROCEDURE — 99072 ADDL SUPL MATRL&STAF TM PHE: CPT

## 2021-01-29 PROCEDURE — 92557 COMPREHENSIVE HEARING TEST: CPT

## 2021-01-29 NOTE — HISTORY OF PRESENT ILLNESS
[de-identified] : 65 year old with acute hearing loss about one month ago. Pt has hearing test that was reviewed by me and shows a right snhl. Pt has a feeling she is by the water. Hearing has stayed the same [Ear Fullness] : no ear fullness [Vertigo] : no vertigo

## 2021-02-14 ENCOUNTER — OUTPATIENT (OUTPATIENT)
Dept: OUTPATIENT SERVICES | Facility: HOSPITAL | Age: 66
LOS: 1 days | End: 2021-02-14
Payer: MEDICARE

## 2021-02-14 ENCOUNTER — APPOINTMENT (OUTPATIENT)
Dept: MRI IMAGING | Facility: HOSPITAL | Age: 66
End: 2021-02-14

## 2021-02-14 DIAGNOSIS — Z98.890 OTHER SPECIFIED POSTPROCEDURAL STATES: Chronic | ICD-10-CM

## 2021-02-14 PROCEDURE — 70553 MRI BRAIN STEM W/O & W/DYE: CPT | Mod: 26

## 2021-02-14 PROCEDURE — 70553 MRI BRAIN STEM W/O & W/DYE: CPT

## 2021-02-26 ENCOUNTER — APPOINTMENT (OUTPATIENT)
Dept: OTOLARYNGOLOGY | Facility: CLINIC | Age: 66
End: 2021-02-26
Payer: MEDICARE

## 2021-02-26 VITALS
HEART RATE: 72 BPM | TEMPERATURE: 97.1 F | SYSTOLIC BLOOD PRESSURE: 156 MMHG | DIASTOLIC BLOOD PRESSURE: 106 MMHG | OXYGEN SATURATION: 98 %

## 2021-02-26 PROCEDURE — 99213 OFFICE O/P EST LOW 20 MIN: CPT | Mod: 25

## 2021-02-26 PROCEDURE — 99072 ADDL SUPL MATRL&STAF TM PHE: CPT

## 2021-02-26 PROCEDURE — 69210 REMOVE IMPACTED EAR WAX UNI: CPT

## 2021-02-26 NOTE — REASON FOR VISIT
[Subsequent Evaluation] : a subsequent evaluation for [FreeTextEntry2] : s/p R IT steroid injection and asymmetric R HL

## 2021-02-26 NOTE — ASSESSMENT
[FreeTextEntry1] : 65F w R asymmetric HL 2 moths ago s/p 1 R IT steroid inj with subjective improvement. \par \par Plan:\par - repeat audiogram\par - HAE

## 2021-02-26 NOTE — PHYSICAL EXAM
[Normal] : tympanic membranes are normal in both ears [de-identified] : L EAC cerumen impaction, removed

## 2021-02-26 NOTE — HISTORY OF PRESENT ILLNESS
[de-identified] : 65F who returns after R asymmetric HL s/p R IT steroid injection 1/29 after 1 month of symptoms. Patient reports she has improved right ear hearing, but still not equal to her left ear. She denies any new ENT complaints. No pertinent FH/SH.

## 2021-02-26 NOTE — PROCEDURE
[Risk and Benefits Discussed] : The purpose, risks, discomforts, benefits and alternatives of the procedure have been explained to the patient including no treatment. [Cerumen Impaction] : Cerumen Impaction [Same] : same as the Pre Op Dx. [] : Removal of Cerumen [FreeTextEntry1] : see subjective [FreeTextEntry2] : left

## 2021-03-05 ENCOUNTER — APPOINTMENT (OUTPATIENT)
Dept: OTOLARYNGOLOGY | Facility: CLINIC | Age: 66
End: 2021-03-05

## 2021-03-10 ENCOUNTER — APPOINTMENT (OUTPATIENT)
Dept: OTOLARYNGOLOGY | Facility: CLINIC | Age: 66
End: 2021-03-10
Payer: MEDICARE

## 2021-03-10 PROCEDURE — 99072 ADDL SUPL MATRL&STAF TM PHE: CPT

## 2021-03-10 PROCEDURE — 92550 TYMPANOMETRY & REFLEX THRESH: CPT

## 2021-03-10 PROCEDURE — 92557 COMPREHENSIVE HEARING TEST: CPT

## 2021-03-19 ENCOUNTER — APPOINTMENT (OUTPATIENT)
Dept: OTOLARYNGOLOGY | Facility: CLINIC | Age: 66
End: 2021-03-19
Payer: MEDICARE

## 2021-03-19 VITALS
TEMPERATURE: 97.1 F | DIASTOLIC BLOOD PRESSURE: 84 MMHG | WEIGHT: 260 LBS | BODY MASS INDEX: 47.84 KG/M2 | HEART RATE: 67 BPM | SYSTOLIC BLOOD PRESSURE: 150 MMHG | OXYGEN SATURATION: 98 % | HEIGHT: 62 IN

## 2021-03-19 PROCEDURE — 69801 INCISE INNER EAR: CPT | Mod: RT

## 2021-03-19 PROCEDURE — 99213 OFFICE O/P EST LOW 20 MIN: CPT | Mod: 25

## 2021-03-19 PROCEDURE — 99072 ADDL SUPL MATRL&STAF TM PHE: CPT

## 2021-03-19 NOTE — HISTORY OF PRESENT ILLNESS
[de-identified] : 65 year old returns with acute hearing loss, Pt. is here for audiology test review that shows minimally improved snhl on the right side. Pt had some subjective improvement on steroid injection. Pt has worsening tinnitus.

## 2021-03-19 NOTE — REASON FOR VISIT
[Subsequent Evaluation] : a subsequent evaluation for [FreeTextEntry2] : s/p R IT steroid injection and asymmetric R HL.

## 2021-04-16 ENCOUNTER — APPOINTMENT (OUTPATIENT)
Dept: OTOLARYNGOLOGY | Facility: CLINIC | Age: 66
End: 2021-04-16

## 2021-07-28 NOTE — H&P ADULT - NSHPRISKPAPSMEAR_GEN_A_CORE
Physical Therapy     Referred by: Jaqueline Cooney MD; Medical Diagnosis (from order):    Diagnosis Information      Diagnosis    438.89, 781.2 (ICD-9-CM) - I69.398, R26.89 (ICD-10-CM) - Impaired balance as late effect of cerebrovascular accident    782.3 (ICD-9-CM) - R60.0 (ICD-10-CM) - Bilateral lower extremity edema    724.2, 338.29 (ICD-9-CM) - M54.5, G89.29 (ICD-10-CM) - Chronic low back pain without sciatica, unspecified back pain laterality                Daily Treatment Note    Visit:  2     SUBJECTIVE                                                                                                             Patient states that she is feeling okay today; mild soreness.     OBJECTIVE                                                                                                                        TREATMENT                                                                                                                  Therapeutic Exercise:  Seated Marching - x20   Sit <> Stand - x10   Ankle Pumps - x15   Seated knee flexion - x20 - orange  Seated knee extension - x20 - orange   Standing marching - on AirEx - x30    Manual Therapy:  Soft tissue mobilization - upper trapezius, scalenes, sub occipitals     Skilled input: verbal instruction/cues    Writer verbally educated and received verbal consent for hand placement, positioning of patient, and techniques to be performed today from patient for hand placement and palpation for techniques and therapist position for techniques as described above and how they are pertinent to the patient's plan of care.      Ultrasound (21701)  Location: Bilateral Cervical   Position: sitting  Duty Cycle: 100%  Frequency: 1 Mhz  Intensity (w/cm2): 1.2  Intensity: patient tolerance  Duration: 8 minutes  Results: decreased pain  Reaction: no adverse reaction to treatment      ASSESSMENT                                                                                                              Patient has good tolerance for weight bearing exercises today and achieves good relief from manual techniques.  She does get short of breath after each exercise however.         PLAN                                                                                                                           Suggestions for next session as indicated: Progress per plan of care         Therapy procedure time and total treatment time can be found documented on the Time Entry flowsheet   yes

## 2022-07-08 ENCOUNTER — APPOINTMENT (OUTPATIENT)
Dept: OTOLARYNGOLOGY | Facility: CLINIC | Age: 67
End: 2022-07-08

## 2022-07-08 VITALS — WEIGHT: 260 LBS | BODY MASS INDEX: 47.84 KG/M2 | HEIGHT: 62 IN

## 2022-07-08 PROCEDURE — 99214 OFFICE O/P EST MOD 30 MIN: CPT

## 2022-07-08 PROCEDURE — 92550 TYMPANOMETRY & REFLEX THRESH: CPT

## 2022-07-08 PROCEDURE — 92557 COMPREHENSIVE HEARING TEST: CPT

## 2022-07-08 RX ORDER — IBUPROFEN 600 MG/1
600 TABLET, FILM COATED ORAL
Qty: 90 | Refills: 0 | Status: ACTIVE | COMMUNITY
Start: 2022-05-31

## 2022-07-08 RX ORDER — CLOTRIMAZOLE AND BETAMETHASONE DIPROPIONATE 10; .5 MG/G; MG/G
1-0.05 CREAM TOPICAL
Qty: 45 | Refills: 0 | Status: ACTIVE | COMMUNITY
Start: 2022-02-08

## 2022-07-08 RX ORDER — DORZOLAMIDE HYDROCHLORIDE 20 MG/ML
2 SOLUTION OPHTHALMIC
Qty: 10 | Refills: 0 | Status: ACTIVE | COMMUNITY
Start: 2022-05-19

## 2022-07-08 RX ORDER — NAPROXEN 500 MG/1
500 TABLET ORAL
Qty: 40 | Refills: 0 | Status: ACTIVE | COMMUNITY
Start: 2022-05-19

## 2022-07-08 RX ORDER — BRIMONIDINE TARTRATE 2 MG/MG
0.2 SOLUTION/ DROPS OPHTHALMIC
Qty: 10 | Refills: 0 | Status: ACTIVE | COMMUNITY
Start: 2022-05-19

## 2022-07-08 RX ORDER — CARVEDILOL 6.25 MG/1
6.25 TABLET, FILM COATED ORAL
Qty: 180 | Refills: 0 | Status: ACTIVE | COMMUNITY
Start: 2022-01-20

## 2022-07-08 NOTE — PHYSICAL EXAM
[Midline] : trachea located in midline position [Normal] : no rashes [de-identified] : eac clear, TM intact, ME clear

## 2022-07-08 NOTE — CONSULT LETTER
[Dear  ___] : Dear  [unfilled], [Courtesy Letter:] : I had the pleasure of seeing your patient, [unfilled], in my office today. [Consult Closing:] : Thank you very much for allowing me to participate in the care of this patient.  If you have any questions, please do not hesitate to contact me. [Sincerely,] : Sincerely, [FreeTextEntry3] : Rich Main MD\par Department of Otolaryngology - Head and Neck Surgery\par [facility]

## 2022-07-08 NOTE — ASSESSMENT
[FreeTextEntry1] : 66F here for initial evaluation. She has h/o asymmetric right sided SNHL diagnosed 2/2021 which was likely sudden and idiopathic. At that time, she had three IT injections with some improvement in speech discrimination scores, but not pure tones. MRI IAC was normal at that time. Since then, she has been status quo and feels hearing is unchanged. There is no otorrhea, otalgia or vertigo. Audiogram from today is mostly unchanged right pure tones with decreased speech discrimination scores (16% vs. 73%) relative to prior testing 3/2021, as above; left sided hearing is normal. Exam is unremarkable.\par She has asymmetric right SNHL, likely idiopathic, and mostly unchanged. For now, most important thing is to ensure good ear remains healthy - avoid loud noise and qtips. RTO 12 months for exam and audio. Can try hearing aids for now.

## 2022-07-08 NOTE — HISTORY OF PRESENT ILLNESS
[de-identified] : 66F here for initial evaluation.\par \par She has h/o asymmetric right sided SNHL diagnosed 2/2021 which was likely sudden and idiopathic. At that time, she had three IT injections with some improvement in speech discrimination scores, but not pure tones. MRI IAC was normal.\par Since then, she has been status quo and feels hearing is unchanged. There is no otorrhea, otalgia or vertigo.\par \par Audiogram from today:\par R ear: severe rising to normal sloping to mild SNHL. SRT 60, 16% discrim, type A\par L ear: normal hearing. SRT 10, 100% discrim, type A\par \par ROS otherwise unremarkable.

## 2023-04-04 NOTE — PROGRESS NOTE ADULT - SUBJECTIVE AND OBJECTIVE BOX
GYN Progress Note    Patient seen at bedside.  Reports feeling well.    Pain controlled on OPM  Tolerating clears - passing flatus  OOB  Voiding    Denies CP, palpitations, SOB, fever, chills, nausea, vomiting.    Vital Signs Last 24 Hrs  T(C): 36.3 (23 Oct 2019 08:48), Max: 36.7 (22 Oct 2019 15:46)  T(F): 97.4 (23 Oct 2019 08:48), Max: 98.1 (22 Oct 2019 22:30)  HR: 53 (23 Oct 2019 08:48) (53 - 60)  BP: 116/73 (23 Oct 2019 08:48) (110/70 - 138/81)  BP(mean): --  RR: 16 (23 Oct 2019 08:48) (16 - 17)  SpO2: 95% (23 Oct 2019 08:48) (94% - 96%)    Physical Exam:  Gen: No Acute Distress  Pulm: Clear to auscultation bilaterally  GI: soft, nontender, mildly distended, +BS, no rebound, no guarding.  Incision C/D/I with staples in place  Ext: SCDs in place, wnl    I&O's Summary    22 Oct 2019 07:01  -  23 Oct 2019 07:00  --------------------------------------------------------  IN: 300 mL / OUT: 401 mL / NET: -101 mL      MEDICATIONS  (STANDING):  BUpivacaine liposome 1.3% Injectable (no eMAR) 20 milliLiter(s) Local Injection once  enoxaparin Injectable 40 milliGRAM(s) SubCutaneous daily  influenza   Vaccine 0.5 milliLiter(s) IntraMuscular once  ketorolac   Injectable 30 milliGRAM(s) IV Push every 6 hours  latanoprost 0.005% Ophthalmic Solution 1 Drop(s) Both EYES at bedtime    MEDICATIONS  (PRN):  docusate sodium 100 milliGRAM(s) Oral three times a day PRN Stool Softening  HYDROmorphone  Injectable 0.5 milliGRAM(s) IV Push every 15 minutes PRN Severe Pain (7 - 10)  metoclopramide Injectable 10 milliGRAM(s) IV Push every 6 hours PRN Nausea and/or Vomiting  ondansetron Injectable 4 milliGRAM(s) IV Push once PRN Postoperative Nausea and/or Vomiting  oxyCODONE    IR 5 milliGRAM(s) Oral every 4 hours PRN Moderate Pain (4 - 6)  oxyCODONE    IR 10 milliGRAM(s) Oral every 4 hours PRN Severe Pain (7 - 10)      LABS:                        11.0   10.54 )-----------( 217      ( 23 Oct 2019 07:25 )             33.4     10-23    140  |  104  |  18  ----------------------------<  136<H>  3.6   |  27  |  1.13    Ca    8.5      23 Oct 2019 07:25  Phos  3.3     10-23  Mg     2.2     10-23 Klisyri Counseling:  I discussed with the patient the risks of Klisyri including but not limited to erythema, scaling, itching, weeping, crusting, and pain.

## 2023-07-07 ENCOUNTER — APPOINTMENT (OUTPATIENT)
Dept: OTOLARYNGOLOGY | Facility: CLINIC | Age: 68
End: 2023-07-07
Payer: MEDICARE

## 2023-07-07 VITALS
HEIGHT: 62 IN | BODY MASS INDEX: 46.56 KG/M2 | WEIGHT: 253 LBS | HEART RATE: 76 BPM | DIASTOLIC BLOOD PRESSURE: 89 MMHG | SYSTOLIC BLOOD PRESSURE: 181 MMHG | TEMPERATURE: 96.4 F

## 2023-07-07 DIAGNOSIS — H91.21 SUDDEN IDIOPATHIC HEARING LOSS, RIGHT EAR: ICD-10-CM

## 2023-07-07 DIAGNOSIS — H90.3 SENSORINEURAL HEARING LOSS, BILATERAL: ICD-10-CM

## 2023-07-07 PROCEDURE — 99213 OFFICE O/P EST LOW 20 MIN: CPT

## 2023-07-07 NOTE — PHYSICAL EXAM
[de-identified] : eac clear, TM intact, ME clear [Midline] : trachea located in midline position [Normal] : no rashes

## 2023-07-07 NOTE — ASSESSMENT
[FreeTextEntry1] : 67F here in followup. There are no new issues since last seen 6/2022. Her ears are the same with no change in hearing. She has h/o asymmetric right sided SNHL diagnosed 2/2021 which was likely sudden and idiopathic. At that time, she had three IT injections with some improvement in speech discrimination scores, but not pure tones. MRI IAC was normal at that time. Since then, she has been status quo and feels hearing is unchanged. There is no otorrhea, otalgia or vertigo. Audiogram from 6/2022 is mostly unchanged right pure tones with decreased speech discrimination scores (16% vs. 73%) relative to prior testing 3/2021, as above; left sided hearing is normal. Exam is unremarkable.\par She has asymmetric right SNHL, likely idiopathic, and mostly unchanged. For now, most important thing is to ensure good ear remains healthy - avoid loud noise and qtips. RTO 12 months for exam and audio. Can try hearing aids for now.

## 2023-07-07 NOTE — HISTORY OF PRESENT ILLNESS
[de-identified] : 67F here in followup.\par \par There are no new issues since last seen 6/2022. Her ears are the same with no change in hearing.\par She has h/o asymmetric right sided SNHL diagnosed 2/2021 which was likely sudden and idiopathic. At that time, she had three IT injections with some improvement in speech discrimination scores, but not pure tones. MRI IAC was normal.\par There is no otorrhea, otalgia or vertigo.\par \par Audiogram 6/2022:\par R ear: severe rising to normal sloping to mild SNHL. SRT 60, 16% discrim, type A\par L ear: normal hearing. SRT 10, 100% discrim, type A\par \par ROS otherwise unremarkable.

## 2023-07-07 NOTE — CONSULT LETTER
[Dear  ___] : Dear  [unfilled], [Courtesy Letter:] : I had the pleasure of seeing your patient, [unfilled], in my office today. [Consult Closing:] : Thank you very much for allowing me to participate in the care of this patient.  If you have any questions, please do not hesitate to contact me. [Sincerely,] : Sincerely, [FreeTextEntry3] : Rich Main MD\par Department of Otolaryngology - Head and Neck Surgery\par [facility]

## 2024-08-08 ENCOUNTER — APPOINTMENT (OUTPATIENT)
Dept: OTOLARYNGOLOGY | Facility: CLINIC | Age: 69
End: 2024-08-08